# Patient Record
Sex: MALE | Race: WHITE | NOT HISPANIC OR LATINO | URBAN - METROPOLITAN AREA
[De-identification: names, ages, dates, MRNs, and addresses within clinical notes are randomized per-mention and may not be internally consistent; named-entity substitution may affect disease eponyms.]

---

## 2024-04-09 ENCOUNTER — OFFICE VISIT (OUTPATIENT)
Dept: URGENT CARE | Facility: CLINIC | Age: 54
End: 2024-04-09
Payer: COMMERCIAL

## 2024-04-09 ENCOUNTER — HOSPITAL ENCOUNTER (OUTPATIENT)
Dept: RADIOLOGY | Facility: CLINIC | Age: 54
Discharge: HOME | End: 2024-04-09
Payer: COMMERCIAL

## 2024-04-09 VITALS
BODY MASS INDEX: 35.79 KG/M2 | SYSTOLIC BLOOD PRESSURE: 168 MMHG | HEIGHT: 70 IN | RESPIRATION RATE: 14 BRPM | DIASTOLIC BLOOD PRESSURE: 109 MMHG | OXYGEN SATURATION: 96 % | TEMPERATURE: 97.6 F | WEIGHT: 250 LBS | HEART RATE: 76 BPM

## 2024-04-09 DIAGNOSIS — K08.89 ODONTALGIA: ICD-10-CM

## 2024-04-09 DIAGNOSIS — S00.83XA CONTUSION OF LEFT JAW REGION: ICD-10-CM

## 2024-04-09 DIAGNOSIS — S09.93XA INJURY OF JAW, INITIAL ENCOUNTER: ICD-10-CM

## 2024-04-09 DIAGNOSIS — S09.93XA INJURY OF JAW, INITIAL ENCOUNTER: Primary | ICD-10-CM

## 2024-04-09 PROCEDURE — 70100 X-RAY EXAM OF JAW <4VIEWS: CPT | Performed by: RADIOLOGY

## 2024-04-09 PROCEDURE — 99204 OFFICE O/P NEW MOD 45 MIN: CPT | Performed by: PHYSICIAN ASSISTANT

## 2024-04-09 PROCEDURE — 70100 X-RAY EXAM OF JAW <4VIEWS: CPT

## 2024-04-09 RX ORDER — METOPROLOL SUCCINATE 50 MG/1
150 TABLET, EXTENDED RELEASE ORAL
COMMUNITY
Start: 2024-01-17

## 2024-04-09 RX ORDER — NAPROXEN 500 MG/1
TABLET ORAL
Qty: 20 TABLET | Refills: 0 | Status: SHIPPED | OUTPATIENT
Start: 2024-04-09

## 2024-04-09 ASSESSMENT — ENCOUNTER SYMPTOMS
GASTROINTESTINAL NEGATIVE: 1
NEUROLOGICAL NEGATIVE: 1
MUSCULOSKELETAL NEGATIVE: 1
CARDIOVASCULAR NEGATIVE: 1
ENDOCRINE NEGATIVE: 1
PSYCHIATRIC NEGATIVE: 1
RESPIRATORY NEGATIVE: 1
ALLERGIC/IMMUNOLOGIC NEGATIVE: 1
CONSTITUTIONAL NEGATIVE: 1
EYES NEGATIVE: 1
HEMATOLOGIC/LYMPHATIC NEGATIVE: 1

## 2024-04-09 ASSESSMENT — PAIN SCALES - GENERAL: PAINLEVEL: 8

## 2024-04-09 NOTE — PROGRESS NOTES
"Subjective   Patient ID: William Duarte is a 53 y.o. male.      History provided by:  Patient   used: No    Mouth Injury    This is a 53 yr old male here for left jaw injury. A wrench hit him in the left lower jaw yesterday while working on a car. Today has left lower tooth pain and left lower jaw pain. No tooth fx    Review of Systems   Constitutional: Negative.    HENT:  Positive for dental problem.    Eyes: Negative.    Respiratory: Negative.     Cardiovascular: Negative.    Gastrointestinal: Negative.    Endocrine: Negative.    Genitourinary: Negative.    Musculoskeletal: Negative.    Skin: Negative.    Allergic/Immunologic: Negative.    Neurological: Negative.    Hematological: Negative.    Psychiatric/Behavioral: Negative.     All other systems reviewed and are negative.  BP (!) 168/109   Pulse 76   Temp 36.4 °C (97.6 °F) (Temporal)   Resp 14   Ht 1.778 m (5' 10\")   Wt 113 kg (250 lb)   SpO2 96%   BMI 35.87 kg/m²     Objective   Physical Exam  Vitals and nursing note reviewed.   Constitutional:       Appearance: Normal appearance.   HENT:      Head: Normocephalic and atraumatic.      Comments: Left lower jaw pain with palpation, no jaw swelling, ecchymosis or open wound     Mouth/Throat:      Mouth: Mucous membranes are moist.      Pharynx: Oropharynx is clear.      Comments: Left lower molar pain with palpation, no tooth fx  Cardiovascular:      Rate and Rhythm: Normal rate and regular rhythm.   Pulmonary:      Effort: Pulmonary effort is normal.      Breath sounds: Normal breath sounds.   Musculoskeletal:      Cervical back: Neck supple.   Lymphadenopathy:      Cervical: No cervical adenopathy.   Neurological:      General: No focal deficit present.      Mental Status: He is alert and oriented to person, place, and time.   Psychiatric:         Mood and Affect: Mood normal.         Behavior: Behavior normal.     Assessment:  Left lower jaw contusion  Odontalgia    Plan:  Mandible fx " negative for fx or dislocation  Naprosyn 500 mg bid with food  Ice painful area 2-3 times a day  Dental follow up this week if not improving or worsening  ER visit anytime 24/7 for acute worsening or changing condition

## 2024-04-09 NOTE — PATIENT INSTRUCTIONS
Ice painful area 2-3 times a day  Dental follow up this week  ER visit anytime 24/7 for acute worsening or changing condition